# Patient Record
Sex: MALE | Race: WHITE | URBAN - METROPOLITAN AREA
[De-identification: names, ages, dates, MRNs, and addresses within clinical notes are randomized per-mention and may not be internally consistent; named-entity substitution may affect disease eponyms.]

---

## 2021-10-28 ENCOUNTER — OFFICE VISIT (OUTPATIENT)
Dept: ORTHOPEDIC SURGERY | Age: 71
End: 2021-10-28
Payer: MEDICARE

## 2021-10-28 ENCOUNTER — PREP FOR PROCEDURE (OUTPATIENT)
Dept: ORTHOPEDIC SURGERY | Age: 71
End: 2021-10-28

## 2021-10-28 VITALS — RESPIRATION RATE: 16 BRPM | WEIGHT: 194 LBS | BODY MASS INDEX: 29.4 KG/M2 | HEIGHT: 68 IN

## 2021-10-28 DIAGNOSIS — M16.12 PRIMARY OSTEOARTHRITIS OF LEFT HIP: Primary | ICD-10-CM

## 2021-10-28 PROCEDURE — 4004F PT TOBACCO SCREEN RCVD TLK: CPT | Performed by: ORTHOPAEDIC SURGERY

## 2021-10-28 PROCEDURE — 4040F PNEUMOC VAC/ADMIN/RCVD: CPT | Performed by: ORTHOPAEDIC SURGERY

## 2021-10-28 PROCEDURE — 3017F COLORECTAL CA SCREEN DOC REV: CPT | Performed by: ORTHOPAEDIC SURGERY

## 2021-10-28 PROCEDURE — 99203 OFFICE O/P NEW LOW 30 MIN: CPT | Performed by: ORTHOPAEDIC SURGERY

## 2021-10-28 PROCEDURE — G8417 CALC BMI ABV UP PARAM F/U: HCPCS | Performed by: ORTHOPAEDIC SURGERY

## 2021-10-28 PROCEDURE — G8484 FLU IMMUNIZE NO ADMIN: HCPCS | Performed by: ORTHOPAEDIC SURGERY

## 2021-10-28 PROCEDURE — 1123F ACP DISCUSS/DSCN MKR DOCD: CPT | Performed by: ORTHOPAEDIC SURGERY

## 2021-10-28 PROCEDURE — G8427 DOCREV CUR MEDS BY ELIG CLIN: HCPCS | Performed by: ORTHOPAEDIC SURGERY

## 2021-10-28 NOTE — LETTER
[address] [place] [address] Ortho & Spine  Surgery Scheduling Form:  [facility]    DEMOGRAPHICS:                                                                                                                  Patient Name:  José Miguel Ha  Patient :  1950   Patient SS#:      Patient Phone:  [phone] (home)                             Patient Address:  [address] [place] [address] [address] [address]    PCP:  No primary care provider on file. Insurance:   Payor/Plan Subscr  Sex Relation Sub. Ins. ID Effective Group Num   1. STATE Jackie Pope 1950 Male Self [id number]                                    [address]   2. MEDICARE - MEBeauty Bumpers 1950 Male Self [id number]                                    [address]      DIAGNOSIS & PROCEDURE:                                                                                              Diagnosis:   Left hip arthritis     M16.12  Operation:  Left Anterior Total Hip Replacement with C-Arm   [address]  Location: [facility]  Surgeon:  Shashi Spaulding MD    [facility] INFORMATION:                                                                                         .  Surgeon's Scheduling Instruction:  January    Requested Date:    OR Time:   Patient Arrival Time:      OR Time Required:  80  Minutes  Anesthesia:  General  Equipment:  Glen Table, Depuy, standard            COVID:  Mini C-Arm:  No   Standard C-Arm:  Yes  Status:  same day admit  PAT Required:  Yes  Comments: NO femoral nerve block    ALLERGIES:Patient has no allergy information on record.                      Fabio Sheffield MD      10/28/21 9:50 AM  BILLING INFORMATION:                                                                                                     Procedure:       CPT Code Modifier  With Yin Jones [id number] [address]                                        H&P AT :      PCP ___XX__                  URGENT CARE ______    José Miguel Ha    TOTAL HIP REPLACEMENT 1950                                                 PHYSICIANS ORDER  HEIGHT:   5'8             WEIGHT:  80     ALLERGIES:Patient has no allergy information on record. SURG                         JET             ________________________________________________________________  PRE-OP ORDERS:  ? CBC WITH DIFFERENTIAL                                                ? TYPE AND SCREEN   ? VITAMIN D LEVELS  ? PREALBUMIN AND ALBUMIN LEVELS                                                           ? HgB A1C                                                                               ? EKG                                                                                        ? NASAL CULUTRE MRSA  ? UAR/if positive repeat UAR on admissioin  ? BMP  ? COAG PROFILE  ? PT/OT EVAL AND TEACHING  ? INSTRUCT PT TO STOP ALL NSAIDS, ASPIRIN, BLOOD THINNERS 7 DAYS PRIOR SURGERY  DAY OF SURGERY  ? CEFAZOLIN 2 GM IVPB; IF PATIENT WEIGHS > 80 KG AND SERUM CREATININE <2.5 mg/dl, GIVE 2 GM DOSE WITHIN 1 HOUR OF INCISION. ? IF THE PRE-OP NASAL CULTURE FOR MRSA WAS POSITIVE:   REPEAT NASAL SWAB ON ADMISSION AND ADMINISTER VANCOMYCIN 15 mg X kg, REDUCE THE DOSE OF VANCOMYCIN  MG IVPB IF PT < 55 KG OR SERUM CREATININE > 2mg/dl; Also to get 2 GM Cefazolin or wt based  ? All patients will receive preop Cefazolin 2 GM or wt based  ? APPLY KNEE HIGH ANTI-EMBOLIC AND PNEUMO-BOOTS TO UNOPERATIVE  LEG  ? MOBIC 7.5 MG ORALLY  DAY OF SURGERY  ?  ROXICODONE 10MG  ORALLY DAY OF SURGERY   Oral Tranexamic acid 1950 mg once 2 hours prior to surgery   Type and screen  OTHER ORDERS:  ____________________________________________________________  PHYSICIAN SIGNATURE:   10/28/2021 9:50 AM   __________________________DATE:

## 2021-10-28 NOTE — PROGRESS NOTES
[facility]  Office Visit    Chief Complaint: Left hip pain    HPI:  Caitlyn Stone is a 70 y.o. who is here for initial evaluation of left foot pain. Reports gradually increasing hip and groin pain on the left side for the past 1 year. He does have a history of anterior right total hip arthroplasty done in 2017 by Dr. Jorge Gibbs at [facility]. He was having similar symptoms in his right hip before the replacement and this is all resolved with his total hip arthroplasty. He is right hip side is doing well. 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 He also has issues while walking his dogs. The patient has difficulty putting on socks and shoes, difficulty getting out of a car, difficulty with ambulation and doing activities of daily living including pain at night. Pain at rest is 7 and with activities 9-10/10. He is otherwise healthy and denies diabetes, history of blood clots, blood thinners, tobacco use, narcotic drug usage, heart or lung problems. He does have help at home. ROS:  Constitutional: denies fever, chills, weight loss  MSK: denies pain in other joints, muscle aches  Neurological: denies numbness, tingling, weakness    Exam:  Resp. rate 16, height 5' 8\" (1.727 m), weight 194 lb (88 kg). Appearance: sitting in exam room chair, appears to be in no acute distress, awake and alert  Resp: unlabored breathing on room air  Skin: warm, dry and intact with out erythema or significant increased temperature  Neuro: grossly intact both lower extremities. Intact sensation to light touch. Motor exam 4+ to 5/5 in all major motor groups. LLE: Examination demonstrates pain left logroll and Stinchfield. There is brisk capillary refill. Strength is 5/5 in hamstrings, quads, hip flexors. Imaging:  AP pelvis, AP and frog-leg lateral views of the left hip were performed and interpreted today.   He has a cam deformity of the femoral head with mild joint space narrowing. There are small periarticular osteophytes. He has a right total hip arthroplasty prosthesis in place that is in good position. Vascular calcification present in both legs. 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 He is having groin pain on a daily basis that is worse with certain activities. This is similar to that she was having his right hip before replacement. He is interested in left total hip arthroplasty to help relieve his pain. The operative procedure, alternatives, and risks were discussed in detail with the patient. The risks include but are not limited to: Infection, vessel injury, nerve injury, DVT, pulmonary embolism, implant loosening, need for revision surgery, leg length discrepancy, dislocation, lateral femoral cutaneous nerve palsy, intraoperative fracture. Despite these risks the patient would like to proceed. All questions have been answered and no guarantees were made. I discussed with the patient the diagnosis in detail and answered all questions. The patient verbalized understanding of the plan as it has been described above and is in agreement. Plan for anterior left total hip arthroplasty in January 2022. Total time spent on today's encounter was at least 32 minutes. This time included reviewing prior notes, radiographs, and lab results when available, reviewing history obtained by medical assistant, performing history and physical exam, reviewing tests/radiographs with the patient, counseling the patient, ordering medications or tests, documentation in the electronic health record, and coordination of care. This dictation was done with Dragon dictation and may contain mechanical errors related to translation.

## 2022-03-29 ENCOUNTER — TELEPHONE (OUTPATIENT)
Dept: ORTHOPEDIC SURGERY | Age: 72
End: 2022-03-29

## 2023-05-25 RX ORDER — DORZOLAMIDE HYDROCHLORIDE AND TIMOLOL MALEATE 20; 5 MG/ML; MG/ML
SOLUTION/ DROPS OPHTHALMIC
COMMUNITY
Start: 2023-03-30

## 2023-05-25 RX ORDER — BRIMONIDINE TARTRATE 2 MG/ML
SOLUTION/ DROPS OPHTHALMIC
COMMUNITY
Start: 2023-02-03

## 2023-05-25 RX ORDER — ACETAMINOPHEN 325 MG/1
650 TABLET ORAL EVERY 6 HOURS PRN
COMMUNITY

## 2023-05-25 RX ORDER — IBUPROFEN 200 MG
400 TABLET ORAL
COMMUNITY

## 2023-05-25 RX ORDER — OFLOXACIN 3 MG/ML
SOLUTION/ DROPS OPHTHALMIC
COMMUNITY
Start: 2023-04-07

## 2023-05-25 RX ORDER — IVERMECTIN 10 MG/G
CREAM TOPICAL
COMMUNITY
Start: 2023-01-18

## 2023-05-25 RX ORDER — PREDNISOLONE ACETATE 10 MG/ML
SUSPENSION/ DROPS OPHTHALMIC
COMMUNITY
Start: 2023-04-07

## 2023-05-25 RX ORDER — ADHESIVE TAPE 3"X 2.3 YD
400 TAPE, NON-MEDICATED TOPICAL
COMMUNITY

## 2023-05-25 RX ORDER — LATANOPROST 50 UG/ML
SOLUTION/ DROPS OPHTHALMIC
COMMUNITY
Start: 2022-12-28

## 2023-05-25 RX ORDER — SENNOSIDES 8.8 MG/5ML
5 LIQUID ORAL NIGHTLY
COMMUNITY
End: 2023-05-25 | Stop reason: ALTCHOICE

## 2023-05-25 RX ORDER — OMEGA-3S/DHA/EPA/FISH OIL/D3 300MG-1000
CAPSULE ORAL
COMMUNITY

## 2023-05-25 RX ORDER — OMEGA-3/DHA/EPA/FISH OIL 300-1000MG
CAPSULE ORAL
COMMUNITY

## 2023-05-25 RX ORDER — KETOROLAC TROMETHAMINE 5 MG/ML
SOLUTION OPHTHALMIC
COMMUNITY
Start: 2023-04-07

## 2023-05-30 NOTE — PRE-PROCEDURE INSTRUCTIONS
[address]  [phone]        Pre-Op Phone Call:     Patient Name: Josselyn Montemayor     Telephone Information:   Mobile [phone]     Home phone:  [phone]    Surgery Time:    2:40 PM     Arrival Time:  6786     Left extended Message:  Yes     Message left with: spoke with patient     Recent change in health status:  No     Advised of transportation/ policy:  Yes     NPO policy reviewed: Yes     Advised to take morning heart/blood pressure medications with sips of water morning of surgery? Yes     Instructed to bring eye drops, photo identification, and insurance card day of surgery? Yes     Advised to wear short sleeved button down shirt (no T-shirt underneath):  Yes     Advised not to wear jewelry, hairpins, or pantyhose day of surgery? Yes     Advised not to wear make-up and to wash face day of surgery?   Yes    Remarks: states understanding with no questions at this time        Electronically signed by:  Mejia Priest RN at 5/30/2023 9:39 AM

## 2023-05-31 ENCOUNTER — ANESTHESIA EVENT (OUTPATIENT)
Dept: SURGERY | Age: 73
End: 2023-05-31
Payer: MEDICARE

## 2023-05-31 NOTE — DISCHARGE INSTRUCTIONS
[facility] [address]       Post-Operative Instructions for Day of Cataract Surgery with Dr. Elisabet Sloan    Patient Name: Shyanne Lyons  : 1950  MRN: [id number]      Bring your eye drops with you to each appointment! 1. A responsible adult, 18 years or older must be in attendance for 24 hours following discharge. 2. Rest quietly today. 3. Start with liquids first.  If no nausea, proceed with a light meal.  Drink at least 6 glasses of fluid after surgery. 4. Do not drive or operate heavy machinery for 24 hours or as directed. 5. No alcoholic beverages for 24 hours post op. 6. Do not make any legal or important decisions for the next 24 hours. 7. Check your temperature over the next five days and call your physician if greater than 101.0 F.    8. Notify your physician if you have rash, hives, difficulty breathing, or severe nausea or vomiting. 5. Contact your family physician for questions concerning your current home medications. 10. If pain intensifies or pain is unrelieved with pain medication as ordered, call your physician    ADDITIONAL INSTRUCTIONS    The post op drops are VERY IMPORTANT. Use them as directed on your drop schedule. Always bring your post-op bag, drops and instruction sheet to all of your visits. Tape your protective shield over your eye at bedtime or naptime for one week to prevent accidentally rubbing or bumping your eye. If you have a patch or a shield on the eye, it is to remain on until you see your doctor on the day following your surgery. Keep the area around your eye clean with a clean face cloth moistened with warm water. Keep soap, lotion, shampoo, hairspray make-up, etc. away from your eye for 7 days. Do not wash your hair for 24 hours. Do not rub your eye. This is the worse thing you can do while healing.   No heavy lifting, bending, or straining for one week after

## 2023-06-01 ENCOUNTER — HOSPITAL ENCOUNTER (OUTPATIENT)
Age: 73
Setting detail: OUTPATIENT SURGERY
Discharge: HOME | End: 2023-06-01
Attending: OPHTHALMOLOGY | Admitting: OPHTHALMOLOGY
Payer: MEDICARE

## 2023-06-01 ENCOUNTER — ANESTHESIA (OUTPATIENT)
Dept: SURGERY | Age: 73
End: 2023-06-01
Payer: MEDICARE

## 2023-06-01 VITALS
DIASTOLIC BLOOD PRESSURE: 89 MMHG | TEMPERATURE: 98 F | WEIGHT: 190 LBS | BODY MASS INDEX: 28.14 KG/M2 | HEART RATE: 66 BPM | HEIGHT: 69 IN | OXYGEN SATURATION: 98 % | RESPIRATION RATE: 15 BRPM | SYSTOLIC BLOOD PRESSURE: 134 MMHG

## 2023-06-01 PROCEDURE — 3700000001 HC ADD 15 MINUTES (ANESTHESIA): Performed by: OPHTHALMOLOGY

## 2023-06-01 PROCEDURE — 2580000003 HC RX 258: Performed by: STUDENT IN AN ORGANIZED HEALTH CARE EDUCATION/TRAINING PROGRAM

## 2023-06-01 PROCEDURE — 6360000002 HC RX W HCPCS: Performed by: OPHTHALMOLOGY

## 2023-06-01 PROCEDURE — 6370000000 HC RX 637 (ALT 250 FOR IP): Performed by: OPHTHALMOLOGY

## 2023-06-01 PROCEDURE — 7100000010 HC PHASE II RECOVERY - FIRST 15 MIN: Performed by: OPHTHALMOLOGY

## 2023-06-01 PROCEDURE — 2500000003 HC RX 250 WO HCPCS: Performed by: OPHTHALMOLOGY

## 2023-06-01 PROCEDURE — 2720000010 HC SURG SUPPLY STERILE: Performed by: OPHTHALMOLOGY

## 2023-06-01 PROCEDURE — 3700000000 HC ANESTHESIA ATTENDED CARE: Performed by: OPHTHALMOLOGY

## 2023-06-01 PROCEDURE — 6360000002 HC RX W HCPCS

## 2023-06-01 PROCEDURE — 2500000003 HC RX 250 WO HCPCS

## 2023-06-01 PROCEDURE — 2709999900 HC NON-CHARGEABLE SUPPLY: Performed by: OPHTHALMOLOGY

## 2023-06-01 PROCEDURE — 3600000014 HC SURGERY LEVEL 4 ADDTL 15MIN: Performed by: OPHTHALMOLOGY

## 2023-06-01 PROCEDURE — 7100000011 HC PHASE II RECOVERY - ADDTL 15 MIN: Performed by: OPHTHALMOLOGY

## 2023-06-01 PROCEDURE — 3600000004 HC SURGERY LEVEL 4 BASE: Performed by: OPHTHALMOLOGY

## 2023-06-01 DEVICE — OMNI SYSTEM FOR US IN A SINGLE PACK
Type: IMPLANTABLE DEVICE | Site: EYE | Status: FUNCTIONAL
Brand: OMNI(R) SURGICAL SYSTEM (US)

## 2023-06-01 RX ORDER — MIDAZOLAM HYDROCHLORIDE 1 MG/ML
INJECTION INTRAMUSCULAR; INTRAVENOUS PRN
Status: DISCONTINUED | OUTPATIENT
Start: 2023-06-01 | End: 2023-06-01 | Stop reason: SDUPTHER

## 2023-06-01 RX ORDER — BALANCED SALT SOLUTION 6.4; .75; .48; .3; 3.9; 1.7 MG/ML; MG/ML; MG/ML; MG/ML; MG/ML; MG/ML
SOLUTION OPHTHALMIC
Status: COMPLETED | OUTPATIENT
Start: 2023-06-01 | End: 2023-06-01

## 2023-06-01 RX ORDER — KETOROLAC TROMETHAMINE 5 MG/ML
1 SOLUTION OPHTHALMIC SEE ADMIN INSTRUCTIONS
Status: COMPLETED | OUTPATIENT
Start: 2023-06-01 | End: 2023-06-01

## 2023-06-01 RX ORDER — SODIUM CHLORIDE 0.9 % (FLUSH) 0.9 %
5-40 SYRINGE (ML) INJECTION PRN
Status: DISCONTINUED | OUTPATIENT
Start: 2023-06-01 | End: 2023-06-01 | Stop reason: HOSPADM

## 2023-06-01 RX ORDER — SODIUM CHLORIDE 0.9 % (FLUSH) 0.9 %
5-40 SYRINGE (ML) INJECTION PRN
Status: CANCELLED | OUTPATIENT
Start: 2023-06-01

## 2023-06-01 RX ORDER — SODIUM CHLORIDE 0.9 % (FLUSH) 0.9 %
5-40 SYRINGE (ML) INJECTION EVERY 12 HOURS SCHEDULED
Status: CANCELLED | OUTPATIENT
Start: 2023-06-01

## 2023-06-01 RX ORDER — CIPROFLOXACIN HYDROCHLORIDE 3.5 MG/ML
1 SOLUTION/ DROPS TOPICAL SEE ADMIN INSTRUCTIONS
Status: DISCONTINUED | OUTPATIENT
Start: 2023-06-01 | End: 2023-06-01 | Stop reason: HOSPADM

## 2023-06-01 RX ORDER — SODIUM CHLORIDE 9 MG/ML
INJECTION, SOLUTION INTRAVENOUS PRN
Status: DISCONTINUED | OUTPATIENT
Start: 2023-06-01 | End: 2023-06-01 | Stop reason: HOSPADM

## 2023-06-01 RX ORDER — ACETAMINOPHEN 325 MG/1
650 TABLET ORAL ONCE
Status: CANCELLED | OUTPATIENT
Start: 2023-06-01 | End: 2023-06-01

## 2023-06-01 RX ORDER — TETRACAINE HYDROCHLORIDE 5 MG/ML
SOLUTION OPHTHALMIC
Status: COMPLETED | OUTPATIENT
Start: 2023-06-01 | End: 2023-06-01

## 2023-06-01 RX ORDER — SODIUM CHLORIDE 9 MG/ML
INJECTION, SOLUTION INTRAVENOUS PRN
Status: CANCELLED | OUTPATIENT
Start: 2023-06-01

## 2023-06-01 RX ORDER — TETRACAINE HYDROCHLORIDE 5 MG/ML
1 SOLUTION OPHTHALMIC SEE ADMIN INSTRUCTIONS
Status: DISCONTINUED | OUTPATIENT
Start: 2023-06-01 | End: 2023-06-01 | Stop reason: HOSPADM

## 2023-06-01 RX ORDER — PROPOFOL 10 MG/ML
INJECTION, EMULSION INTRAVENOUS PRN
Status: DISCONTINUED | OUTPATIENT
Start: 2023-06-01 | End: 2023-06-01 | Stop reason: SDUPTHER

## 2023-06-01 RX ORDER — SODIUM CHLORIDE 0.9 % (FLUSH) 0.9 %
5-40 SYRINGE (ML) INJECTION EVERY 12 HOURS SCHEDULED
Status: DISCONTINUED | OUTPATIENT
Start: 2023-06-01 | End: 2023-06-01 | Stop reason: HOSPADM

## 2023-06-01 RX ORDER — NEOMYCIN SULFATE, POLYMYXIN B SULFATE, AND DEXAMETHASONE 3.5; 10000; 1 MG/G; [USP'U]/G; MG/G
OINTMENT OPHTHALMIC
Status: COMPLETED | OUTPATIENT
Start: 2023-06-01 | End: 2023-06-01

## 2023-06-01 RX ORDER — TROPICAMIDE 10 MG/ML
1 SOLUTION/ DROPS OPHTHALMIC SEE ADMIN INSTRUCTIONS
Status: COMPLETED | OUTPATIENT
Start: 2023-06-01 | End: 2023-06-01

## 2023-06-01 RX ORDER — ONDANSETRON 2 MG/ML
4 INJECTION INTRAMUSCULAR; INTRAVENOUS
Status: CANCELLED | OUTPATIENT
Start: 2023-06-01 | End: 2023-06-02

## 2023-06-01 RX ORDER — PHENYLEPHRINE HCL 2.5 %
1 DROPS OPHTHALMIC (EYE) SEE ADMIN INSTRUCTIONS
Status: COMPLETED | OUTPATIENT
Start: 2023-06-01 | End: 2023-06-01

## 2023-06-01 RX ORDER — LIDOCAINE HYDROCHLORIDE 20 MG/ML
INJECTION, SOLUTION EPIDURAL; INFILTRATION; INTRACAUDAL; PERINEURAL PRN
Status: DISCONTINUED | OUTPATIENT
Start: 2023-06-01 | End: 2023-06-01 | Stop reason: SDUPTHER

## 2023-06-01 RX ADMIN — TROPICAMIDE 1 DROP: 10 SOLUTION/ DROPS OPHTHALMIC at 14:00

## 2023-06-01 RX ADMIN — TROPICAMIDE 1 DROP: 10 SOLUTION/ DROPS OPHTHALMIC at 13:55

## 2023-06-01 RX ADMIN — LIDOCAINE HYDROCHLORIDE 100 MG: 20 INJECTION, SOLUTION EPIDURAL; INFILTRATION; INTRACAUDAL; PERINEURAL at 15:00

## 2023-06-01 RX ADMIN — TETRACAINE HYDROCHLORIDE 1 DROP: 5 SOLUTION OPHTHALMIC at 13:55

## 2023-06-01 RX ADMIN — PHENYLEPHRINE HYDROCHLORIDE 1 DROP: 25 SOLUTION/ DROPS OPHTHALMIC at 14:05

## 2023-06-01 RX ADMIN — KETOROLAC TROMETHAMINE 1 DROP: 0.5 SOLUTION OPHTHALMIC at 13:55

## 2023-06-01 RX ADMIN — PROPOFOL 150 MG: 10 INJECTION, EMULSION INTRAVENOUS at 15:00

## 2023-06-01 RX ADMIN — SODIUM CHLORIDE: 9 INJECTION, SOLUTION INTRAVENOUS at 14:01

## 2023-06-01 RX ADMIN — PHENYLEPHRINE HYDROCHLORIDE 1 DROP: 25 SOLUTION/ DROPS OPHTHALMIC at 14:00

## 2023-06-01 RX ADMIN — PHENYLEPHRINE HYDROCHLORIDE 1 DROP: 25 SOLUTION/ DROPS OPHTHALMIC at 13:55

## 2023-06-01 RX ADMIN — MIDAZOLAM 1 MG: 1 INJECTION INTRAMUSCULAR; INTRAVENOUS at 15:11

## 2023-06-01 RX ADMIN — MIDAZOLAM 1 MG: 1 INJECTION INTRAMUSCULAR; INTRAVENOUS at 14:58

## 2023-06-01 RX ADMIN — CIPROFLOXACIN 1 DROP: 3 SOLUTION OPHTHALMIC at 13:55

## 2023-06-01 RX ADMIN — TROPICAMIDE 1 DROP: 10 SOLUTION/ DROPS OPHTHALMIC at 14:05

## 2023-06-01 ASSESSMENT — PAIN SCALES - GENERAL
PAINLEVEL_OUTOF10: 0

## 2023-06-01 NOTE — ANESTHESIA POSTPROCEDURE EVALUATION
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     Shaila Phase I: Shaila Score: 10    Shaila Phase II: Shaila Score: 10      Anesthesia Post Evaluation    Patient location during evaluation: bedside  Patient participation: complete - patient participated  Level of consciousness: awake and alert  Pain score: 0  Airway patency: patent  Nausea & Vomiting: no vomiting  Complications: no  Cardiovascular status: blood pressure returned to baseline  Respiratory status: acceptable  Hydration status: euvolemic

## 2023-06-01 NOTE — PROGRESS NOTES
Instructions and eye drops reviewed with patient and . ABDIRAHMAN's surgery scheduler contacted about Pilocarpine being listed on drop schedule from the OR. Per surgery scheduler the Pilocarpine is not necessary for this patient so they were instructed that a new drop schedule would be given at follow-up. Patient discharged with patch and shield in place and instructed no eye drops in operative eye until patch is taken off at follow-up.

## 2023-06-01 NOTE — INTERVAL H&P NOTE
Update History & Physical    The patient's History and Physical of May 30, 2023 was reviewed with the patient and I examined the patient. There was no change. The surgical site was confirmed by the patient and me. Plan: The risks, benefits, expected outcome, and alternative to the recommended procedure have been discussed with the patient. Patient understands and wants to proceed with the procedure.      Electronically signed by Chantal Leo MD on 6/1/2023 at 2:25 PM

## (undated) DEVICE — HANDLE CLIP ON MICROSCOPE

## (undated) DEVICE — SOLUTION IRRIG BSS ST 500ML

## (undated) DEVICE — GLOVE SURG SZ 65 L12IN FNGR THK87MIL WHT LTX FREE

## (undated) DEVICE — MICROSURGICAL INSTRUMENT VISCOAT CANNULA 27GA THREADED HUB: Brand: ALCON

## (undated) DEVICE — 3 ML PHARMACY TRAY,LUER-LOCK TIP: Brand: MONOJECT

## (undated) DEVICE — MICROSURGICAL INSTRUMENT ANTERIOR CHAMBER CANNULA 30GA: Brand: ALCON

## (undated) DEVICE — SURGICAL PROC PACK SHT WEST V4

## (undated) DEVICE — Device

## (undated) DEVICE — OPHTHALMIC KNIFE 15°: Brand: ALCON

## (undated) DEVICE — 3 ML SYRINGE LUER-LOCK TIP: Brand: MONOJECT